# Patient Record
Sex: FEMALE | Race: WHITE | NOT HISPANIC OR LATINO | Employment: FULL TIME | URBAN - METROPOLITAN AREA
[De-identification: names, ages, dates, MRNs, and addresses within clinical notes are randomized per-mention and may not be internally consistent; named-entity substitution may affect disease eponyms.]

---

## 2017-03-25 ENCOUNTER — OFFICE VISIT (OUTPATIENT)
Dept: URGENT CARE | Facility: CLINIC | Age: 47
End: 2017-03-25
Payer: COMMERCIAL

## 2017-03-25 PROCEDURE — 99203 OFFICE O/P NEW LOW 30 MIN: CPT

## 2017-06-22 ENCOUNTER — APPOINTMENT (OUTPATIENT)
Dept: LAB | Facility: CLINIC | Age: 47
End: 2017-06-22
Payer: COMMERCIAL

## 2017-06-22 ENCOUNTER — TRANSCRIBE ORDERS (OUTPATIENT)
Dept: LAB | Facility: CLINIC | Age: 47
End: 2017-06-22

## 2017-06-22 DIAGNOSIS — E55.9 UNSPECIFIED VITAMIN D DEFICIENCY: Primary | ICD-10-CM

## 2017-06-22 DIAGNOSIS — E04.2 MULTINODULAR GOITER: ICD-10-CM

## 2017-06-22 DIAGNOSIS — E55.9 UNSPECIFIED VITAMIN D DEFICIENCY: ICD-10-CM

## 2017-06-22 LAB
25(OH)D3 SERPL-MCNC: 31.4 NG/ML (ref 30–100)
TSH SERPL DL<=0.05 MIU/L-ACNC: 0.68 UIU/ML (ref 0.36–3.74)

## 2017-06-22 PROCEDURE — 84443 ASSAY THYROID STIM HORMONE: CPT

## 2017-06-22 PROCEDURE — 82306 VITAMIN D 25 HYDROXY: CPT

## 2017-06-22 PROCEDURE — 36415 COLL VENOUS BLD VENIPUNCTURE: CPT

## 2017-07-11 ENCOUNTER — ALLSCRIPTS OFFICE VISIT (OUTPATIENT)
Dept: OTHER | Facility: OTHER | Age: 47
End: 2017-07-11

## 2017-12-12 ENCOUNTER — ALLSCRIPTS OFFICE VISIT (OUTPATIENT)
Dept: OTHER | Facility: OTHER | Age: 47
End: 2017-12-12

## 2017-12-13 NOTE — PROGRESS NOTES
Assessment    1  Otitis media, left (382 9) (H66 92)   2  Acute URI (465 9) (J06 9)    Plan  Otitis media, left    · Amoxicillin 500 MG Oral Capsule; Take one tablet by mouth Q12 hours    Discussion/Summary    Hydration, prn tylenol/motrin, may continue dayquilwater garglesif no improvement in 5-7 days  The patient was counseled regarding instructions for management,-- risk factor reductions,-- patient and family education,-- impressions,-- importance of compliance with treatment  Possible side effects of new medications were reviewed with the patient/guardian today  The treatment plan was reviewed with the patient/guardian  The patient/guardian understands and agrees with the treatment plan      Chief Complaint  Pt has c/o being sick  Pt sated her son was very sick and he was seen at [address] [place] [address]  But now pt is feeling horrible  Pt stated she has a sore throat and congestion  History of Present Illness  HPI: patient is a healthy 42 yo female who presents with feeling feverish, nasal congestion, left ear pain x 3 days  Her son has been sick with GI sx last week, now ear infection and upper respiratory sx  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  She feels lousy, achy, like I got hit by a bus  She denies cough, sob, wheezing, abd pain, n/v/d  She has a slight tickle in her throat  She has been taking ibuprofen, dayquil, airborne  Her neck glands feel sore and swollen  Review of Systems   Constitutional: feeling poorly,-- chills,-- feeling tired-- and-- feverish but no documented fevers, though has been in antipyretics  ENT: earache,-- sore throat-- and-- nasal discharge, but-- no nosebleeds,-- no hearing loss-- and-- no hoarseness  Cardiovascular: the heart rate was not slow,-- no chest pain,-- the heart rate was not fast-- and-- no palpitations  Respiratory: no shortness of breath,-- no cough,-- no wheezing-- and-- no shortness of breath during exertion    Gastrointestinal: no abdominal pain,-- no nausea,-- no vomiting,-- no constipation-- and-- no diarrhea  Genitourinary: no dysuria-- and-- no incontinence  Musculoskeletal: myalgias, but-- no arthralgias-- and-- no joint swelling  Integumentary: no itching,-- no skin lesions-- and-- no skin wound  Neurological: headache, but-- no numbness,-- no tingling,-- no confusion,-- no dizziness-- and-- no fainting  ROS reviewed  Active Problems    1  Allergic rhinitis (477 9) (J30 9)   2  Anemia (285 9) (D64 9)   3  Anxiety (300 00) (F41 9)   4  Benign paroxysmal positional vertigo of right ear (386 11) (H81 11)   5  Fatigue (780 79) (R53 83)   6  Hyperlipidemia (272 4) (E78 5)   7  Hypertension (401 9) (I10)   8  Hypocalcemia (275 41) (E83 51)   9  Mitral valve disorder (424 0) (I05 9)   10  Motion sickness (994 6) (T75 3XXA)   11  Multiple thyroid nodules (241 1) (E04 2)   12  Preoperative examination (V72 84) (Z01 818)   13  Prolapse of female pelvic organs (618 9) (N81 9)   14  Sleep disorder (780 50) (G47 9)   15  Strep pharyngitis (034 0) (J02 0)   16  Uterine prolapse (618 1) (N81 4)    Past Medical History  1  History of Nontoxic single thyroid nodule (241 0) (E04 1)   2  History of Nontoxic single thyroid nodule (241 0) (E04 1)   3  History of Tubal Pregnancy (633 10)  Active Problems And Past Medical History Reviewed: The active problems and past medical history were reviewed and updated today  Family History  Sibling    1  No pertinent family history  Family History    2  Family history of Marfan Syndrome  Family History Reviewed: The family history was reviewed and updated today  Social History   · Being A Social Drinker   · Never smoked cigarettes (V49 89) (Z78 9)   · Uses Safety Equipment - Seatbelts  The social history was reviewed and updated today  Surgical History  1  History of  Section   2  History of Dilation And Curettage  Surgical History Reviewed:    The surgical history was reviewed and updated today  Current Meds    The medication list was reviewed and updated today  Allergies  1  Demerol TABS    Vitals   Recorded: 75Uyo1073 09:57AM   Temperature 97 4 F   Heart Rate 61   Respiration 17   Systolic 708   Diastolic 82   Height 5 ft 5 in   Weight 168 lb    BMI Calculated 27 96   BSA Calculated 1 84   O2 Saturation 98       Physical Exam   Constitutional  General appearance: No acute distress, well appearing and well nourished  acutely ill,-- appears tired-- and-- well hydrated  Eyes  Conjunctiva and lids: No swelling, erythema or discharge  Pupils and irises: Equal, round and reactive to light  Ears, Nose, Mouth, and Throat  External inspection of ears and nose: Normal    Otoscopic examination: Abnormal   The right tympanic membrane was not red,-- was not bulging-- and-- had no perforation  The left tympanic membrane was red, but-- was not bulging-- and-- had no perforation  The right external canal was normal  The left external canal was normal  Exam of the right middle ear showed a middle ear effusion  Exam of the left middle ear showed a middle ear effusion  Nasal mucosa, septum, and turbinates: Abnormal   There was clear rhinorrhea from both nares  The bilateral nasal mucosa was crusted,-- edematous-- and-- red  Oropharynx: Normal with no erythema, edema, exudate or lesions  Pulmonary  Respiratory effort: No increased work of breathing or signs of respiratory distress  Auscultation of lungs: Clear to auscultation  Cardiovascular  Auscultation of heart: Normal rate and rhythm, normal S1 and S2, without murmurs  Examination of extremities for edema and/or varicosities: Normal    Abdomen  Abdomen: Non-tender, no masses  Lymphatic  Palpation of lymph nodes in neck: Abnormal  -- (with tenderness) right anterior cervical node enlargement, but-- no left anterior cervical node enlargement    Musculoskeletal  Gait and station: Normal    Digits and nails: Normal without clubbing or cyanosis  Skin  Skin and subcutaneous tissue: Normal without rashes or lesions  -- pallor  Neurologic  Cranial nerves: Cranial nerves 2-12 intact  Psychiatric  Orientation to person, place, and time: Normal    Mood and affect: Normal          Signatures   Electronically signed by :  Jeffrey Gillis [facility]; Dec 12 2017 10:27AM EST                       (Author)    Electronically signed by : Ofelia Pelaez DO; Dec 12 2017  4:29PM EST                       (Author)

## 2018-01-15 VITALS
DIASTOLIC BLOOD PRESSURE: 82 MMHG | BODY MASS INDEX: 27.2 KG/M2 | TEMPERATURE: 98.1 F | WEIGHT: 163.25 LBS | HEIGHT: 65 IN | SYSTOLIC BLOOD PRESSURE: 114 MMHG | HEART RATE: 62 BPM | OXYGEN SATURATION: 98 %

## 2018-01-16 NOTE — PROGRESS NOTES
Assessment    1  Benign paroxysmal positional vertigo of right ear (386 11) (H81 10)   2  Hypertension (401 9) (I10)   3  Never smoked cigarettes (V49 89) (Z78 9)    Plan  Benign paroxysmal positional vertigo of right ear    · Azithromycin 250 MG Oral Tablet; TAKE 2 TABLETS ON DAY 1 THEN TAKE 1  TABLET A DAY FOR 4 DAYS   · Meclizine HCl - 12 5 MG Oral Tablet; TAKE 1 TABLET 3 TIMES DAILY AS NEEDED  Hypertension    · Follow-up visit in 1 week Evaluation and Treatment  Follow-up  Status: Hold For -  Scheduling  Requested for: [id number]   · Restrict your sodium (salt) intake to 2 grams per day ; Status:Complete;   Done:  [id number]     Return in one week for reevaluation  Discussion/Summary    I started the patient on antibiotic and meclizine  Epley/[place] maneuvers were reviewed for benign positional vertigo  Handout was given  A low-salt diet was recommended  Possible side effects of new medications were reviewed with the patient/guardian today  The treatment plan was reviewed with the patient/guardian  The patient/guardian understands and agrees with the treatment plan      Chief Complaint    1  Dizziness   2  Ear Pain  Patient presents for continued dizziness and right ear pain after being seen in the [address] [place] [address] ED for same reason  The patient is here for an emergency room follow-up  History of Present Illness  The patient is here because of dizziness for more than week  She has no respiratory symptoms but yesterday her ear started hurting  She feels like there is fluid in in her right ear but she does not see any on the q-tip  She had has no fever chills or night sweats  She went to the emergency room in [address] [place] [address] on January 20, 2016  I reviewed the complete record and EKG and labs were all normal      Serjio Spicer presents with complaints of gradual onset of intermittent episodes of moderate dizziness, described as lightheadedness  Episodes started about 10 days ago   Her symptoms are caused by no known event  Symptoms are improved by lying still  Symptoms are made worse by head movement, turning head, flexing neck, rolling over in bed and getting up quickly  Symptoms are unchanged  Associated symptoms include ear pain and neck pain, but no weakness, no syncope, no difficulty ambulating, no nausea, no vomiting, no ear fullness and no tinnitus  Review of Systems    Constitutional: no fever, not feeling poorly, no chills and not feeling tired  Eyes: no eye pain and no purulent discharge from the eyes  ENT: no earache and no nasal discharge  Cardiovascular: no chest pain and no palpitations  Respiratory: no shortness of breath and no cough  Gastrointestinal: no abdominal pain  Musculoskeletal: no arthralgias  Active Problems    1  Allergic rhinitis (477 9) (J30 9)   2  Anemia (285 9) (D64 9)   3  Anxiety (300 00) (F41 9)   4  Fatigue (780 79) (R53 83)   5  Hyperlipidemia (272 4) (E78 5)   6  Hypertension (401 9) (I10)   7  Hypocalcemia (275 41) (E83 51)   8  Mitral valve disorder (424 0) (I05 9)   9  Multiple thyroid nodules (241 1) (E04 2)   10  Sleep disorder (780 50) (G47 9)    Past Medical History    1  History of Nontoxic single thyroid nodule (241 0) (E04 1)   2  History of Nontoxic single thyroid nodule (241 0) (E04 1)   3  History of Tubal Pregnancy (633 10)    The active problems and past medical history were reviewed and updated today  Surgical History    1  History of  Section   2  History of Dilation And Curettage    The surgical history was reviewed and updated today  Family History    1  No pertinent family history    2  Family history of Marfan Syndrome    The family history was reviewed and updated today  Social History    · Being A Social Drinker   · Never smoked cigarettes (V49 89) (Z78 9)   · Uses Safety Equipment - Seatbelts  The social history was reviewed and updated today  The social history was reviewed and is unchanged        Current Meds 1  Vitamin D-3 1000 UNIT Oral Capsule; TAKE 2 CAPSULE Daily; Therapy: [id number] to Recorded   2  Zolpidem Tartrate 10 MG Oral Tablet; TAKE 1 TABLET AT BEDTIME AS NEEDED; Therapy: 54SHK1540 to (Evaluate:53Kgu7293); Last Rx:[id number] Ordered    The medication list was reviewed and updated today  Allergies    1  Demerol TABS    Vitals  Vital Signs [Data Includes: Current Encounter]    Recorded: 77TFD0057 08:52AM   Temperature 98 9 F   Heart Rate 54   Systolic 010   Diastolic 90   Height 5 ft 5 in   Weight 169 lb 6 08 oz   BMI Calculated 28 19   BSA Calculated 1 85   O2 Saturation 98     Physical Exam    Constitutional   General appearance: No acute distress, well appearing and well nourished  Head and Face   Head and face: Normal     Palpation of the face and sinuses: No sinus tenderness  Eyes   Conjunctiva and lids: No swelling, erythema or discharge  Ears, Nose, Mouth, and Throat   External inspection of ears and nose: Normal     Otoscopic examination: Abnormal   Right ear canal is excoriated  Lips, teeth, and gums: Normal, good dentition  Oropharynx: Normal with no erythema, edema, exudate or lesions  Neck   Neck: Abnormal   With tenderness to palpation over the paraspinal musculature of her neck and extension is painful  Cardiovascular   Auscultation of heart: Normal rate and rhythm, normal S1 and S2, no murmurs  Carotid pulses: 2+ bilaterally  Abdomen   Abdomen: Non-tender, no masses  Liver and spleen: No hepatomegaly or splenomegaly  Lymphatic   Palpation of lymph nodes in neck: No lymphadenopathy  Neurologic   Cranial nerves: Cranial nerves II-XII intact  Cortical function: Normal mental status  Psychiatric   Judgment and insight: Normal     Orientation to person, place, and time: Normal     Recent and remote memory: Intact      Mood and affect: Normal        Results/Data  Encounter Results   PHQ-9 Adult Depression Screening 08FNO3001 09:42AM User, Ahs     Test Name Result Flag Reference   PHQ-9 Adult Depression Score 1     Q1: 0, Q2: 0, Q3: 1, Q4: 0, Q5: 0, Q6: 0, Q7: 0, Q8: 0, Q9: 0   PHQ-9 Adult Depression Screening Negative     PHQ-9 Difficulty Level Not difficult at all     PHQ-9 Severity Minimal Depression         Signatures   Electronically signed by : Jeny Brown DO; Jan 27 2016 10:24AM EST                       (Author)

## 2018-01-22 VITALS
DIASTOLIC BLOOD PRESSURE: 82 MMHG | TEMPERATURE: 97.4 F | WEIGHT: 168 LBS | BODY MASS INDEX: 27.99 KG/M2 | SYSTOLIC BLOOD PRESSURE: 130 MMHG | OXYGEN SATURATION: 98 % | RESPIRATION RATE: 17 BRPM | HEART RATE: 61 BPM | HEIGHT: 65 IN

## 2018-12-26 ENCOUNTER — OFFICE VISIT (OUTPATIENT)
Dept: FAMILY MEDICINE CLINIC | Facility: CLINIC | Age: 48
End: 2018-12-26
Payer: COMMERCIAL

## 2018-12-26 VITALS
BODY MASS INDEX: 29.69 KG/M2 | OXYGEN SATURATION: 100 % | TEMPERATURE: 98.4 F | WEIGHT: 178.2 LBS | DIASTOLIC BLOOD PRESSURE: 84 MMHG | RESPIRATION RATE: 16 BRPM | HEART RATE: 60 BPM | HEIGHT: 65 IN | SYSTOLIC BLOOD PRESSURE: 124 MMHG

## 2018-12-26 DIAGNOSIS — T75.3XXS MOTION SICKNESS, SEQUELA: ICD-10-CM

## 2018-12-26 DIAGNOSIS — E55.9 VITAMIN D DEFICIENCY: Primary | ICD-10-CM

## 2018-12-26 DIAGNOSIS — Z12.39 BREAST CANCER SCREENING: ICD-10-CM

## 2018-12-26 DIAGNOSIS — Z13.220 SCREENING CHOLESTEROL LEVEL: ICD-10-CM

## 2018-12-26 DIAGNOSIS — Z13.1 DIABETES MELLITUS SCREENING: ICD-10-CM

## 2018-12-26 DIAGNOSIS — Z13.0 SCREENING FOR DEFICIENCY ANEMIA: ICD-10-CM

## 2018-12-26 PROBLEM — N81.4 UTERINE PROLAPSE: Status: ACTIVE | Noted: 2017-07-12

## 2018-12-26 PROBLEM — N39.46 MIXED INCONTINENCE: Status: ACTIVE | Noted: 2017-05-24

## 2018-12-26 PROBLEM — N81.2 UTEROVAGINAL PROLAPSE, INCOMPLETE: Status: ACTIVE | Noted: 2017-05-24

## 2018-12-26 PROBLEM — N81.9 PROLAPSE OF FEMALE PELVIC ORGANS: Status: ACTIVE | Noted: 2017-07-12

## 2018-12-26 PROBLEM — T75.3XXA MOTION SICKNESS: Status: ACTIVE | Noted: 2017-07-12

## 2018-12-26 PROBLEM — K59.02 OUTLET DYSFUNCTION CONSTIPATION: Status: ACTIVE | Noted: 2017-05-24

## 2018-12-26 PROBLEM — M75.00 ADHESIVE CAPSULITIS OF SHOULDER: Status: ACTIVE | Noted: 2018-12-26

## 2018-12-26 PROCEDURE — 99214 OFFICE O/P EST MOD 30 MIN: CPT | Performed by: PHYSICIAN ASSISTANT

## 2018-12-26 RX ORDER — SCOLOPAMINE TRANSDERMAL SYSTEM 1 MG/1
1 PATCH, EXTENDED RELEASE TRANSDERMAL
Qty: 10 PATCH | Refills: 0 | Status: SHIPPED | OUTPATIENT
Start: 2018-12-26 | End: 2021-02-17

## 2018-12-26 RX ORDER — SCOLOPAMINE TRANSDERMAL SYSTEM 1 MG/1
1 PATCH, EXTENDED RELEASE TRANSDERMAL
COMMUNITY
Start: 2017-07-12 | End: 2018-12-26 | Stop reason: SDUPTHER

## 2018-12-26 RX ORDER — SCOLOPAMINE TRANSDERMAL SYSTEM 1 MG/1
1 PATCH, EXTENDED RELEASE TRANSDERMAL
Qty: 10 PATCH | Refills: 0 | Status: SHIPPED | OUTPATIENT
Start: 2018-12-26 | End: 2018-12-26 | Stop reason: SDUPTHER

## 2018-12-26 RX ORDER — BIOTIN 1 MG
5000 TABLET ORAL
COMMUNITY

## 2018-12-26 NOTE — PATIENT INSTRUCTIONS

## 2018-12-26 NOTE — PROGRESS NOTES
Routine Follow-up    Tez Golden 57 y o  female   Date:  12/26/2018      Assessment and Plan:    Alina Lazaro was seen today for follow-up  Diagnoses and all orders for this visit:    Vitamin D deficiency  -     Vitamin D 25 hydroxy; Future    Screening for deficiency anemia  -     CBC and differential; Future    Screening cholesterol level  -     Lipid panel; Future    Diabetes mellitus screening  -     Comprehensive metabolic panel; Future    Motion sickness, sequela  -     scopolamine (TRANSDERM-SCOP, 1 5 MG,) 1 5 mg/3 days TD 72 hr patch; Place 1 patch on the skin every 3 (three) days    Breast cancer screening  -     Mammo screening bilateral w cad; Future            HPI:  Chief Complaint   Patient presents with    Follow-up     Would like to have blood work checked - vitamin d was low in the past  Going on a cruise and would like patches to help with sea sickness     HPI  Patient is a 56 yo female who presents for routine follow up and for motion sickness patches  She was last seen a little over 1 year ago  She goes to Dr Beatris Dorsey for eye exams  She sees Dr Zayda Villalobos for regular gyn care, she had hysterectomy last year  She recently followed with endocrinology for hx of thyroid nodules  She is going on a cruise in 2 weeks and wishes to have scolopmine patches which she used int he past with benefit  She wishes to have routine blood work done, other wise no concerns  ROS: Review of Systems   Constitutional: Negative for chills, fatigue, fever and unexpected weight change  HENT: Negative for congestion, ear pain, hearing loss, nosebleeds, sore throat and trouble swallowing  Eyes: Negative for pain, discharge and visual disturbance  Respiratory: Negative for cough, shortness of breath and wheezing  Cardiovascular: Negative for chest pain, palpitations and leg swelling  Gastrointestinal: Negative for abdominal pain, blood in stool, constipation, diarrhea, nausea and vomiting     Endocrine: Negative for cold intolerance and heat intolerance  Genitourinary: Negative for difficulty urinating, dysuria and hematuria  Musculoskeletal: Negative for arthralgias, gait problem and myalgias  Skin: Negative for color change, rash and wound  Neurological: Negative for dizziness, syncope, weakness, light-headedness and headaches  Hematological: Negative for adenopathy  Does not bruise/bleed easily  Psychiatric/Behavioral: Negative for confusion and sleep disturbance  The patient is not nervous/anxious          Past Medical History:   Diagnosis Date    Nontoxic single thyroid nodule     Last assessed 2015    Tubal pregnancy      Patient Active Problem List   Diagnosis    Adhesive capsulitis of shoulder    Allergic rhinitis    Anemia    Anxiety    Benign paroxysmal positional vertigo of right ear    Hyperlipidemia    Mitral valve disorder    Mixed incontinence    Motion sickness    Multinodular goiter    Multiple thyroid nodules    Outlet dysfunction constipation    Prolapse of female pelvic organs    Uterine prolapse    Uterovaginal prolapse, incomplete    Vitamin D deficiency       Past Surgical History:   Procedure Laterality Date     SECTION      DILATION AND CURETTAGE OF UTERUS      HYSTERECTOMY         Social History     Social History    Marital status: /Civil Union     Spouse name: N/A    Number of children: N/A    Years of education: N/A     Social History Main Topics    Smoking status: Never Smoker    Smokeless tobacco: Never Used    Alcohol use Yes      Comment: Social    Drug use: Unknown    Sexual activity: Not Asked     Other Topics Concern    None     Social History Narrative    Uses seatbelts       Family History   Problem Relation Age of Onset    Marfan syndrome Family     No Known Problems Family        Allergies   Allergen Reactions    Meperidine Hives and GI Intolerance     (morphine OK)         Current Outpatient Prescriptions:    Cholecalciferol (VITAMIN D3) 1000 units CAPS, Take 5,000 Units by mouth, Disp: , Rfl:     Multiple Vitamin (MULTI-VITAMIN DAILY PO), Take 1 tablet by mouth, Disp: , Rfl:     Omega-3 Fatty Acids (FISH OIL PO), Take 2 g by mouth, Disp: , Rfl:     scopolamine (TRANSDERM-SCOP, 1 5 MG,) 1 5 mg/3 days TD 72 hr patch, Place 1 patch on the skin every 3 (three) days, Disp: 10 patch, Rfl: 0      Physical Exam:  /84 (BP Location: Left arm, Patient Position: Sitting)   Pulse 60   Temp 98 4 °F (36 9 °C) (Tympanic)   Resp 16   Ht 5' 4 5" (1 638 m)   Wt 80 8 kg (178 lb 3 2 oz)   SpO2 100%   BMI 30 12 kg/m²     Physical Exam   Constitutional: She is oriented to person, place, and time  Vital signs are normal  She appears well-developed and well-nourished  No distress  HENT:   Head: Normocephalic and atraumatic  Right Ear: Tympanic membrane, external ear and ear canal normal    Left Ear: Tympanic membrane, external ear and ear canal normal    Nose: Nose normal    Mouth/Throat: Oropharynx is clear and moist    Eyes: Pupils are equal, round, and reactive to light  Conjunctivae and lids are normal    Neck: Trachea normal and normal range of motion  Neck supple  Cardiovascular: Normal rate, regular rhythm, S1 normal, S2 normal and intact distal pulses  Exam reveals no gallop  No murmur heard  Pulmonary/Chest: Breath sounds normal  No respiratory distress  She has no wheezes  She has no rhonchi  She has no rales  Musculoskeletal: Normal range of motion  She exhibits no edema or deformity  Neurological: She is alert and oriented to person, place, and time  She has normal reflexes  No cranial nerve deficit or sensory deficit  Skin: Skin is warm and dry  No rash noted  No cyanosis  No pallor  Nails show no clubbing  Psychiatric: She has a normal mood and affect   Her behavior is normal  Cognition and memory are normal          Labs:  Lab Results   Component Value Date    WBC 8 50 08/13/2015    HGB 13 1 08/13/2015    HCT 37 4 08/13/2015    MCV 91 08/13/2015     08/13/2015     Lab Results   Component Value Date     08/13/2015    K 4 2 08/13/2015     08/13/2015    CO2 25 0 08/13/2015    ANIONGAP 11 08/13/2015    BUN 13 08/13/2015    CREATININE 0 63 08/13/2015    GLUCOSE 87 08/13/2015    CALCIUM 8 7 08/13/2015    AST 21 08/13/2015    ALT 37 08/13/2015    ALKPHOS 59 08/13/2015    PROT 7 0 08/13/2015    BILITOT 0 57 08/13/2015

## 2018-12-29 ENCOUNTER — OFFICE VISIT (OUTPATIENT)
Dept: URGENT CARE | Facility: CLINIC | Age: 48
End: 2018-12-29
Payer: COMMERCIAL

## 2018-12-29 VITALS
HEART RATE: 88 BPM | RESPIRATION RATE: 16 BRPM | DIASTOLIC BLOOD PRESSURE: 66 MMHG | BODY MASS INDEX: 29.66 KG/M2 | OXYGEN SATURATION: 97 % | WEIGHT: 178 LBS | SYSTOLIC BLOOD PRESSURE: 142 MMHG | TEMPERATURE: 102.4 F | HEIGHT: 65 IN

## 2018-12-29 DIAGNOSIS — J11.1 INFLUENZA: Primary | ICD-10-CM

## 2018-12-29 PROCEDURE — 87631 RESP VIRUS 3-5 TARGETS: CPT | Performed by: NURSE PRACTITIONER

## 2018-12-29 PROCEDURE — 99213 OFFICE O/P EST LOW 20 MIN: CPT | Performed by: NURSE PRACTITIONER

## 2018-12-29 RX ORDER — OSELTAMIVIR PHOSPHATE 75 MG/1
75 CAPSULE ORAL 2 TIMES DAILY
Qty: 10 CAPSULE | Refills: 0 | Status: SHIPPED | OUTPATIENT
Start: 2018-12-29 | End: 2019-01-03

## 2018-12-29 NOTE — PATIENT INSTRUCTIONS
Influenza   AMBULATORY CARE:   Influenza  (the flu) is an infection caused by the influenza virus  The flu is easily spread when an infected person coughs, sneezes, or has close contact with others  You may be able to spread the flu to others for 1 week or longer after signs or symptoms appear  Common signs and symptoms include the following:   · Fever and chills    · Headaches, body aches, and muscle or joint pain    · Cough, runny nose, and sore throat    · Loss of appetite, nausea, vomiting, or diarrhea    · Tiredness    · Trouble breathing  Call 911 for any of the following:   · You have trouble breathing, and your lips look purple or blue  · You have a seizure  Seek care immediately if:   · You are dizzy, or you are urinating less or not at all  · You have a headache with a stiff neck, and you feel tired or confused  · You have new pain or pressure in your chest     · Your symptoms, such as shortness of breath, vomiting, or diarrhea, get worse  · Your symptoms, such as fever and coughing, seem to get better, but then get worse  Contact your healthcare provider if:   · You have new muscle pain or weakness  · You have questions or concerns about your condition or care  Treatment for influenza  may include any of the following:  · Acetaminophen  decreases pain and fever  It is available without a doctor's order  Ask how much to take and how often to take it  Follow directions  Acetaminophen can cause liver damage if not taken correctly  · NSAIDs , such as ibuprofen, help decrease swelling, pain, and fever  This medicine is available with or without a doctor's order  NSAIDs can cause stomach bleeding or kidney problems in certain people  If you take blood thinner medicine, always ask your healthcare provider if NSAIDs are safe for you  Always read the medicine label and follow directions  · Antivirals  help fight a viral infection    Manage your symptoms:   · Rest  as much as you can to help you recover  · Drink liquids as directed  to help prevent dehydration  Ask how much liquid to drink each day and which liquids are best for you  Prevent the spread of the flu:   · Wash your hands often  Use soap and water  Wash your hands after you use the bathroom, change a child's diapers, or sneeze  Wash your hands before you prepare or eat food  Use gel hand cleanser when soap and water are not available  Do not touch your eyes, nose, or mouth unless you have washed your hands first            · Cover your mouth when you sneeze or cough  Cough into a tissue or the bend of your arm  · Clean shared items with a germ-killing   Clean table surfaces, doorknobs, and light switches  Do not share towels, silverware, and dishes with people who are sick  Wash bed sheets, towels, silverware, and dishes with soap and water  · Wear a mask  over your mouth and nose if you are sick or are near anyone who is sick  · Stay away from others  if you are sick  · Influenza vaccine  helps prevent influenza (flu)  Everyone older than 6 months should get a yearly influenza vaccine  Get the vaccine as soon as it is available, usually in September or October each year  Follow up with your healthcare provider as directed:  Write down your questions so you remember to ask them during your visits  © 2017 [employer] Information is for End User's use only and may not be sold, redistributed or otherwise used for commercial purposes  All illustrations and images included in CareNotes® are the copyrighted property of [employer]  or [employer]  The above information is an  only  It is not intended as medical advice for individual conditions or treatments  Talk to your doctor, nurse or pharmacist before following any medical regimen to see if it is safe and effective for you

## 2018-12-29 NOTE — PROGRESS NOTES
[facility] Now        NAME: Kem Romero is a 57 y o  female  : 1970    MRN: [id number]  DATE: 2018  TIME: 3:32 PM    Assessment and Plan   Influenza [J11 1]  1  Influenza  Influenza A/B and RSV by PCR         Patient Instructions       Follow up with PCP in 3-5 days  Proceed to  ER if symptoms worsen  Chief Complaint     Chief Complaint   Patient presents with    Fever     x 1 day    Flu Symptoms         History of Present Illness       70-year-old female presents to urgent care with chief complaint of new onset yesterday fevers as high as 102 4, generalized body aches, headache, dry nonproductive cough and nasal congestion for 1 day  Have he has used over-the-counter Tylenol no relief noted in symptoms      Fever   This is a new problem  The current episode started yesterday  The problem occurs constantly  The problem has been unchanged  Associated symptoms include chills, congestion, coughing, fatigue, a fever, headaches and myalgias  Pertinent negatives include no abdominal pain, anorexia, arthralgias, change in bowel habit, chest pain, diaphoresis, joint swelling, nausea, neck pain, numbness, rash, sore throat, swollen glands, urinary symptoms, vertigo, visual change, vomiting or weakness  Nothing aggravates the symptoms  She has tried acetaminophen for the symptoms  The treatment provided no relief  Flu Symptoms   This is a new problem  The current episode started yesterday  The problem occurs constantly  The problem has been unchanged  Associated symptoms include chills, congestion, coughing, fatigue, a fever, headaches and myalgias  Pertinent negatives include no abdominal pain, anorexia, arthralgias, change in bowel habit, chest pain, diaphoresis, joint swelling, nausea, neck pain, numbness, rash, sore throat, swollen glands, urinary symptoms, vertigo, visual change, vomiting or weakness  Nothing aggravates the symptoms  She has tried acetaminophen for the symptoms  The treatment provided no relief  Review of Systems   Review of Systems   Constitutional: Positive for chills, fatigue and fever  Negative for diaphoresis  HENT: Positive for congestion  Negative for sore throat  Eyes: Negative  Respiratory: Positive for cough  Cardiovascular: Negative  Negative for chest pain  Gastrointestinal: Negative  Negative for abdominal pain, anorexia, change in bowel habit, nausea and vomiting  Endocrine: Negative  Genitourinary: Negative  Musculoskeletal: Positive for myalgias  Negative for arthralgias, joint swelling and neck pain  Skin: Negative  Negative for rash  Allergic/Immunologic: Negative  Neurological: Positive for headaches  Negative for vertigo, weakness and numbness  Hematological: Negative  Psychiatric/Behavioral: Negative  All other systems reviewed and are negative          Current Medications       Current Outpatient Prescriptions:     Cholecalciferol (VITAMIN D3) 1000 units CAPS, Take 5,000 Units by mouth, Disp: , Rfl:     Multiple Vitamin (MULTI-VITAMIN DAILY PO), Take 1 tablet by mouth, Disp: , Rfl:     Omega-3 Fatty Acids (FISH OIL PO), Take 2 g by mouth, Disp: , Rfl:     scopolamine (TRANSDERM-SCOP, 1 5 MG,) 1 5 mg/3 days TD 72 hr patch, Place 1 patch on the skin every 3 (three) days (Patient not taking: Reported on 12/29/2018 ), Disp: 10 patch, Rfl: 0    Current Allergies     Allergies as of 12/29/2018 - Reviewed 12/29/2018   Allergen Reaction Noted    Meperidine Hives and GI Intolerance 06/20/2007            The following portions of the patient's history were reviewed and updated as appropriate: allergies, current medications, past family history, past medical history, past social history, past surgical history and problem list      Past Medical History:   Diagnosis Date    Nontoxic single thyroid nodule     Last assessed 6/29/2015    Tubal pregnancy        Past Surgical History:   Procedure Laterality Date     SECTION      DILATION AND CURETTAGE OF UTERUS      HYSTERECTOMY         Family History   Problem Relation Age of Onset    Marfan syndrome Family     No Known Problems Family          Medications have been verified  Objective   /66   Pulse 88   Temp (!) 102 4 °F (39 1 °C)   Resp 16   Ht 5' 5" (1 651 m)   Wt 80 7 kg (178 lb)   SpO2 97%   BMI 29 62 kg/m²        Physical Exam     Physical Exam   Constitutional: She is oriented to person, place, and time  Vital signs are normal  She appears well-developed and well-nourished  HENT:   Head: Normocephalic and atraumatic  Right Ear: Hearing, tympanic membrane, external ear and ear canal normal    Left Ear: Hearing, tympanic membrane, external ear and ear canal normal    Nose: Rhinorrhea and sinus tenderness present  Right sinus exhibits no maxillary sinus tenderness and no frontal sinus tenderness  Left sinus exhibits no maxillary sinus tenderness and no frontal sinus tenderness  Mouth/Throat: Uvula is midline and mucous membranes are normal  Posterior oropharyngeal erythema present  Eyes: Pupils are equal, round, and reactive to light  Conjunctivae and EOM are normal    Neck: Trachea normal, normal range of motion and full passive range of motion without pain  Cardiovascular: Normal rate and regular rhythm  Pulmonary/Chest: Effort normal and breath sounds normal    Abdominal: Soft  Normal appearance  Musculoskeletal: Normal range of motion  Lymphadenopathy:     She has cervical adenopathy  Right cervical: Posterior cervical adenopathy present  No superficial cervical adenopathy present  Left cervical: Posterior cervical adenopathy present  No superficial cervical adenopathy present  Neurological: She is alert and oriented to person, place, and time  Nursing note and vitals reviewed

## 2018-12-31 LAB
FLUAV AG SPEC QL: DETECTED
FLUBV AG SPEC QL: ABNORMAL
RSV B RNA SPEC QL NAA+PROBE: ABNORMAL

## 2019-01-02 ENCOUNTER — OFFICE VISIT (OUTPATIENT)
Dept: FAMILY MEDICINE CLINIC | Facility: CLINIC | Age: 49
End: 2019-01-02
Payer: COMMERCIAL

## 2019-01-02 VITALS
HEIGHT: 64 IN | WEIGHT: 174 LBS | BODY MASS INDEX: 29.71 KG/M2 | RESPIRATION RATE: 18 BRPM | SYSTOLIC BLOOD PRESSURE: 100 MMHG | OXYGEN SATURATION: 98 % | TEMPERATURE: 98.6 F | HEART RATE: 60 BPM | DIASTOLIC BLOOD PRESSURE: 80 MMHG

## 2019-01-02 DIAGNOSIS — R05.8 COUGH PRODUCTIVE OF PURULENT SPUTUM: ICD-10-CM

## 2019-01-02 DIAGNOSIS — J10.1 INFLUENZA A: Primary | ICD-10-CM

## 2019-01-02 PROCEDURE — 99214 OFFICE O/P EST MOD 30 MIN: CPT | Performed by: FAMILY MEDICINE

## 2019-01-02 PROCEDURE — 3008F BODY MASS INDEX DOCD: CPT | Performed by: FAMILY MEDICINE

## 2019-01-02 RX ORDER — AZITHROMYCIN 250 MG/1
TABLET, FILM COATED ORAL
Qty: 6 TABLET | Refills: 0 | Status: SHIPPED | OUTPATIENT
Start: 2019-01-02 | End: 2019-01-06

## 2019-01-02 NOTE — PROGRESS NOTES
Assessment/Plan:       Diagnoses and all orders for this visit:    Influenza A  Comments:  finish tamiflu as rx'd 4 days ago, continue supportive measures  Orders:  -     XR chest pa & lateral; Future    Cough productive of purulent sputum  -     XR chest pa & lateral; Future  -     Sputum culture and Gram stain; Future  -     azithromycin (ZITHROMAX) 250 mg tablet; Take 2 tablets today then 1 tablet daily x 4 days          Subjective:   Chief Complaint   Patient presents with    Cough        Patient ID: Ellen Lu is a 57 y o  female  same day sick appt   was seen at [facility] 4 days ago, and dx as presumptive influenza, started on tamiflu then, and positive flu A result at lab 12/31, but c/o coughing up green and feverishness for past 3 days  "Still feel weak"  Has one dose left of tamiflu  "Everyone sick at home"  Pt has no hx asthma and is a nonsmoker          The following portions of the patient's history were reviewed and updated as appropriate: allergies, current medications, past family history, past medical history, past social history, past surgical history and problem list     Review of Systems   Constitutional: Positive for appetite change, fatigue and fever  Negative for diaphoresis and unexpected weight change  HENT: Negative for ear pain, mouth sores, nosebleeds, sinus pain, sinus pressure, sore throat and trouble swallowing  Eyes: Negative  Respiratory: Negative for apnea, choking, chest tightness, shortness of breath, wheezing and stridor  Per hpi   Cardiovascular: Negative  Skin: Negative  Hematological: Negative  Objective:      /80   Pulse 60   Temp 98 6 °F (37 °C)   Resp 18   Ht 5' 4 17" (1 63 m)   Wt 78 9 kg (174 lb)   SpO2 98%   BMI 29 71 kg/m²          Physical Exam   Constitutional: She appears well-developed  She is cooperative  Non-toxic appearance  She does not have a sickly appearance  She appears ill  No distress     HENT:   Head: Normocephalic and atraumatic  Right Ear: Tympanic membrane and ear canal normal    Left Ear: Tympanic membrane and ear canal normal    Nose: Mucosal edema and rhinorrhea present  Right sinus exhibits no maxillary sinus tenderness and no frontal sinus tenderness  Left sinus exhibits no maxillary sinus tenderness and no frontal sinus tenderness  Mouth/Throat: Uvula is midline, oropharynx is clear and moist and mucous membranes are normal    Eyes: Pupils are equal, round, and reactive to light  Conjunctivae and lids are normal    Neck: Trachea normal  Neck supple  No JVD present  No thyroid mass and no thyromegaly present  Cardiovascular: Normal rate, regular rhythm and normal heart sounds  Pulmonary/Chest: Effort normal and breath sounds normal    Lymphadenopathy:     She has no cervical adenopathy  Right: No supraclavicular adenopathy present  Left: No supraclavicular adenopathy present  Neurological: She is alert  Skin: Skin is warm and dry  She is not diaphoretic  No cyanosis  No pallor  Nursing note and vitals reviewed

## 2019-04-10 ENCOUNTER — APPOINTMENT (OUTPATIENT)
Dept: LAB | Facility: CLINIC | Age: 49
End: 2019-04-10
Payer: COMMERCIAL

## 2019-04-10 DIAGNOSIS — Z13.1 DIABETES MELLITUS SCREENING: ICD-10-CM

## 2019-04-10 DIAGNOSIS — E55.9 VITAMIN D DEFICIENCY: ICD-10-CM

## 2019-04-10 DIAGNOSIS — Z13.220 SCREENING CHOLESTEROL LEVEL: ICD-10-CM

## 2019-04-10 DIAGNOSIS — Z13.0 SCREENING FOR DEFICIENCY ANEMIA: ICD-10-CM

## 2019-04-10 LAB
25(OH)D3 SERPL-MCNC: 42.8 NG/ML (ref 30–100)
ALBUMIN SERPL BCP-MCNC: 4 G/DL (ref 3.5–5)
ALP SERPL-CCNC: 56 U/L (ref 46–116)
ALT SERPL W P-5'-P-CCNC: 21 U/L (ref 12–78)
ANION GAP SERPL CALCULATED.3IONS-SCNC: 4 MMOL/L (ref 4–13)
AST SERPL W P-5'-P-CCNC: 17 U/L (ref 5–45)
BASOPHILS # BLD AUTO: 0.01 THOUSANDS/ΜL (ref 0–0.1)
BASOPHILS NFR BLD AUTO: 0 % (ref 0–1)
BILIRUB SERPL-MCNC: 0.74 MG/DL (ref 0.2–1)
BUN SERPL-MCNC: 14 MG/DL (ref 5–25)
CALCIUM SERPL-MCNC: 8.5 MG/DL (ref 8.3–10.1)
CHLORIDE SERPL-SCNC: 104 MMOL/L (ref 100–108)
CHOLEST SERPL-MCNC: 152 MG/DL (ref 50–200)
CO2 SERPL-SCNC: 26 MMOL/L (ref 21–32)
CREAT SERPL-MCNC: 0.7 MG/DL (ref 0.6–1.3)
EOSINOPHIL # BLD AUTO: 0.06 THOUSAND/ΜL (ref 0–0.61)
EOSINOPHIL NFR BLD AUTO: 1 % (ref 0–6)
ERYTHROCYTE [DISTWIDTH] IN BLOOD BY AUTOMATED COUNT: 12 % (ref 11.6–15.1)
GFR SERPL CREATININE-BSD FRML MDRD: 104 ML/MIN/1.73SQ M
GLUCOSE P FAST SERPL-MCNC: 94 MG/DL (ref 65–99)
HCT VFR BLD AUTO: 39.6 % (ref 34.8–46.1)
HDLC SERPL-MCNC: 55 MG/DL (ref 40–60)
HGB BLD-MCNC: 13.1 G/DL (ref 11.5–15.4)
IMM GRANULOCYTES # BLD AUTO: 0.01 THOUSAND/UL (ref 0–0.2)
IMM GRANULOCYTES NFR BLD AUTO: 0 % (ref 0–2)
LDLC SERPL CALC-MCNC: 81 MG/DL (ref 0–100)
LYMPHOCYTES # BLD AUTO: 2.07 THOUSANDS/ΜL (ref 0.6–4.47)
LYMPHOCYTES NFR BLD AUTO: 33 % (ref 14–44)
MCH RBC QN AUTO: 30.9 PG (ref 26.8–34.3)
MCHC RBC AUTO-ENTMCNC: 33.1 G/DL (ref 31.4–37.4)
MCV RBC AUTO: 93 FL (ref 82–98)
MONOCYTES # BLD AUTO: 0.51 THOUSAND/ΜL (ref 0.17–1.22)
MONOCYTES NFR BLD AUTO: 8 % (ref 4–12)
NEUTROPHILS # BLD AUTO: 3.69 THOUSANDS/ΜL (ref 1.85–7.62)
NEUTS SEG NFR BLD AUTO: 58 % (ref 43–75)
NONHDLC SERPL-MCNC: 97 MG/DL
NRBC BLD AUTO-RTO: 0 /100 WBCS
PLATELET # BLD AUTO: 196 THOUSANDS/UL (ref 149–390)
PMV BLD AUTO: 11.9 FL (ref 8.9–12.7)
POTASSIUM SERPL-SCNC: 3.8 MMOL/L (ref 3.5–5.3)
PROT SERPL-MCNC: 7.5 G/DL (ref 6.4–8.2)
RBC # BLD AUTO: 4.24 MILLION/UL (ref 3.81–5.12)
SODIUM SERPL-SCNC: 134 MMOL/L (ref 136–145)
TRIGL SERPL-MCNC: 80 MG/DL
WBC # BLD AUTO: 6.35 THOUSAND/UL (ref 4.31–10.16)

## 2019-04-10 PROCEDURE — 80061 LIPID PANEL: CPT

## 2019-04-10 PROCEDURE — 85025 COMPLETE CBC W/AUTO DIFF WBC: CPT

## 2019-04-10 PROCEDURE — 82306 VITAMIN D 25 HYDROXY: CPT

## 2019-04-10 PROCEDURE — 80053 COMPREHEN METABOLIC PANEL: CPT

## 2019-04-10 PROCEDURE — 36415 COLL VENOUS BLD VENIPUNCTURE: CPT

## 2019-05-28 ENCOUNTER — OFFICE VISIT (OUTPATIENT)
Dept: URGENT CARE | Facility: CLINIC | Age: 49
End: 2019-05-28
Payer: COMMERCIAL

## 2019-05-28 VITALS
SYSTOLIC BLOOD PRESSURE: 102 MMHG | RESPIRATION RATE: 20 BRPM | TEMPERATURE: 97 F | HEART RATE: 68 BPM | OXYGEN SATURATION: 99 % | DIASTOLIC BLOOD PRESSURE: 78 MMHG

## 2019-05-28 DIAGNOSIS — J01.00 ACUTE MAXILLARY SINUSITIS, RECURRENCE NOT SPECIFIED: Primary | ICD-10-CM

## 2019-05-28 PROCEDURE — 99213 OFFICE O/P EST LOW 20 MIN: CPT | Performed by: PHYSICIAN ASSISTANT

## 2019-05-28 RX ORDER — AMOXICILLIN AND CLAVULANATE POTASSIUM 875; 125 MG/1; MG/1
1 TABLET, FILM COATED ORAL EVERY 12 HOURS SCHEDULED
Qty: 20 TABLET | Refills: 0 | Status: SHIPPED | OUTPATIENT
Start: 2019-05-28 | End: 2019-06-07

## 2019-05-28 RX ORDER — PREDNISONE 20 MG/1
TABLET ORAL
Qty: 13 TABLET | Refills: 0 | Status: SHIPPED | OUTPATIENT
Start: 2019-05-28 | End: 2021-02-17

## 2019-11-19 ENCOUNTER — TRANSCRIBE ORDERS (OUTPATIENT)
Dept: LAB | Facility: CLINIC | Age: 49
End: 2019-11-19

## 2019-11-26 ENCOUNTER — TRANSCRIBE ORDERS (OUTPATIENT)
Dept: LAB | Facility: CLINIC | Age: 49
End: 2019-11-26

## 2019-11-26 ENCOUNTER — APPOINTMENT (OUTPATIENT)
Dept: LAB | Facility: CLINIC | Age: 49
End: 2019-11-26
Payer: COMMERCIAL

## 2019-11-26 DIAGNOSIS — E04.2 MULTINODULAR GOITER: Primary | ICD-10-CM

## 2019-11-26 DIAGNOSIS — E04.2 MULTINODULAR GOITER: ICD-10-CM

## 2019-11-26 LAB — TSH SERPL DL<=0.05 MIU/L-ACNC: 1.18 UIU/ML (ref 0.36–3.74)

## 2019-11-26 PROCEDURE — 84443 ASSAY THYROID STIM HORMONE: CPT

## 2019-11-26 PROCEDURE — 36415 COLL VENOUS BLD VENIPUNCTURE: CPT

## 2020-12-23 ENCOUNTER — TRANSCRIBE ORDERS (OUTPATIENT)
Dept: LAB | Facility: CLINIC | Age: 50
End: 2020-12-23

## 2020-12-23 ENCOUNTER — LAB (OUTPATIENT)
Dept: LAB | Facility: CLINIC | Age: 50
End: 2020-12-23
Payer: COMMERCIAL

## 2020-12-23 DIAGNOSIS — E04.2 MULTINODULAR GOITER: ICD-10-CM

## 2020-12-23 DIAGNOSIS — E04.2 MULTINODULAR GOITER: Primary | ICD-10-CM

## 2020-12-23 LAB — TSH SERPL DL<=0.05 MIU/L-ACNC: 0.79 UIU/ML (ref 0.36–3.74)

## 2020-12-23 PROCEDURE — 36415 COLL VENOUS BLD VENIPUNCTURE: CPT

## 2020-12-23 PROCEDURE — 84443 ASSAY THYROID STIM HORMONE: CPT

## 2021-01-27 ENCOUNTER — NURSE TRIAGE (OUTPATIENT)
Dept: OTHER | Facility: OTHER | Age: 51
End: 2021-01-27

## 2021-01-27 DIAGNOSIS — U07.1 COVID-19 DETERMINED BY CLINICAL DIAGNOSTIC CRITERIA: Primary | ICD-10-CM

## 2021-01-28 DIAGNOSIS — U07.1 COVID-19 DETERMINED BY CLINICAL DIAGNOSTIC CRITERIA: ICD-10-CM

## 2021-01-28 PROCEDURE — U0003 INFECTIOUS AGENT DETECTION BY NUCLEIC ACID (DNA OR RNA); SEVERE ACUTE RESPIRATORY SYNDROME CORONAVIRUS 2 (SARS-COV-2) (CORONAVIRUS DISEASE [COVID-19]), AMPLIFIED PROBE TECHNIQUE, MAKING USE OF HIGH THROUGHPUT TECHNOLOGIES AS DESCRIBED BY CMS-2020-01-R: HCPCS | Performed by: PHYSICIAN ASSISTANT

## 2021-01-28 PROCEDURE — U0005 INFEC AGEN DETEC AMPLI PROBE: HCPCS | Performed by: PHYSICIAN ASSISTANT

## 2021-01-28 NOTE — TELEPHONE ENCOUNTER
Regarding: COVID-exposed  ----- Message from Dixie Gonazlez sent at 1/27/2021  7:34 PM EST -----  "covid test request, direct exposure, asymptomatic"     (radha 1 of 2)

## 2021-01-28 NOTE — TELEPHONE ENCOUNTER
Reason for Disposition   [1] COVID-19 infection suspected by caller or triager AND [2] mild symptoms (cough, fever, or others) AND [5] no complications or SOB    Additional Information   [1] Symptoms of COVID-19 (e g , cough, fever, SOB, or others) AND [2] within 14 days of EXPOSURE (close contact) with diagnosed or suspected COVID-19 patient    Answer Assessment - Initial Assessment Questions  1  COVID-19 CLOSE CONTACT: "Who is the person with the confirmed or suspected COVID-19 infection that you were exposed to?"       tested positive for covid yesterday  2  PLACE of CONTACT: "Where were you when you were exposed to COVID-19?" (e g , home, school, medical waiting room; which city?)    home  3  TYPE of CONTACT: "How much contact was there?" (e g , sitting next to, live in same house, work in same office, same building)     Live in same house  4  DURATION of CONTACT: "How long were you in contact with the COVID-19 patient?" (e g , a few seconds, passed by person, a few minutes, 15 minutes or longer, live with the patient)      Lives with patient  5  MASK: "Were you wearing a mask?" "Was the other person wearing a mask?" Note: wearing a mask reduces the risk of an otherwise close contact  unmasked  6  DATE of CONTACT: "When did you have contact with a COVID-19 patient?" (e g , how many days ago)      1/27  7  COMMUNITY SPREAD: "Are there lots of cases of COVID-19 (community spread) where you live?" (See public health department website, if unsure)        no  8  SYMPTOMS: "Do you have any symptoms?" (e g , fever, cough, breathing difficulty, loss of taste or smell)      Cold like symptoms started today  9  PREGNANCY OR POSTPARTUM: "Is there any chance you are pregnant?" "When was your last menstrual period?" "Did you deliver in the last 2 weeks?"      No - LMP - hysterectomy  10  HIGH RISK: "Do you have any heart or lung problems?  Do you have a weak immune system?" (e g , heart failure, COPD, asthma, HIV positive, chemotherapy, renal failure, diabetes mellitus, sickle cell anemia, obesity)       no  11  TRAVEL: "Have you traveled out of the country recently?" If so, "When and where?" Also ask about out-of-state travel, since the CDC has identified some high-risk cities for community spread in the  Note: Travel becomes less relevant if there is widespread community transmission where the patient lives         no    Protocols used: CORONAVIRUS (COVID-19) DIAGNOSED OR SUSPECTED-ADULT-AH, CORONAVIRUS (COVID-19) EXPOSURE-ADULT-AH

## 2021-01-29 LAB — SARS-COV-2 RNA RESP QL NAA+PROBE: POSITIVE

## 2021-02-08 ENCOUNTER — TELEPHONE (OUTPATIENT)
Dept: FAMILY MEDICINE CLINIC | Facility: CLINIC | Age: 51
End: 2021-02-08

## 2021-02-08 NOTE — TELEPHONE ENCOUNTER
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 Please advise  Thank you      Ok to place [address]

## 2021-02-17 ENCOUNTER — TELEMEDICINE (OUTPATIENT)
Dept: FAMILY MEDICINE CLINIC | Facility: CLINIC | Age: 51
End: 2021-02-17
Payer: COMMERCIAL

## 2021-02-17 ENCOUNTER — TELEPHONE (OUTPATIENT)
Dept: FAMILY MEDICINE CLINIC | Facility: CLINIC | Age: 51
End: 2021-02-17

## 2021-02-17 DIAGNOSIS — E04.2 MULTINODULAR GOITER: ICD-10-CM

## 2021-02-17 DIAGNOSIS — R53.83 FATIGUE, UNSPECIFIED TYPE: ICD-10-CM

## 2021-02-17 DIAGNOSIS — Z86.16 PERSONAL HISTORY OF COVID-19: Primary | ICD-10-CM

## 2021-02-17 DIAGNOSIS — E78.5 HYPERLIPIDEMIA, UNSPECIFIED HYPERLIPIDEMIA TYPE: ICD-10-CM

## 2021-02-17 PROCEDURE — 99213 OFFICE O/P EST LOW 20 MIN: CPT | Performed by: FAMILY MEDICINE

## 2021-02-17 NOTE — PROGRESS NOTES
COVID-19 Virtual Visit     Assessment/Plan:  Jason Salgado was seen today for covid-19  Diagnoses and all orders for this visit:    Personal history of covid-19    Fatigue, unspecified type  -     CBC and differential; Future    Multinodular goiter  Comments:  pt has had no labs for almost 2 years and no routine f/u appt here since 2018- advised obtain labs and schedule appt for f/u and to discuss results  Orders:  -     TSH, 3rd generation with Free T4 reflex; Future    Hyperlipidemia, unspecified hyperlipidemia type  -     Lipid panel; Future  -     Comprehensive metabolic panel; Future        Problem List Items Addressed This Visit        Endocrine    Multinodular goiter    Relevant Orders    TSH, 3rd generation with Free T4 reflex       Other    Personal history of covid-19 - Primary    Hyperlipidemia    Relevant Orders    Lipid panel    Comprehensive metabolic panel      Other Visit Diagnoses     Fatigue, unspecified type        Relevant Orders    CBC and differential         Disposition:     I have spent 20 minutes directly with the patient  Encounter provider Comfort Koenig DO    Provider located at [address] [place] [address] Se  [address] [facility] [id number]    Recent Visits  Date Type Provider Dept   02/17/21 Telemedicine Bakari Atkins   02/17/21 Telephone Dagmar Prader, [address] [place] [address] [facility] At [address] [facility] recent visits within past 7 days and meeting all other requirements     Future Appointments  No visits were found meeting these conditions  Showing future appointments within next 150 days and meeting all other requirements      This virtual check-in was done via [employer] and patient was informed that this is a secure, HIPAA-compliant platform  She agrees to proceed  Patient agrees to participate in a virtual check in via telephone or video visit instead of presenting to the office to address urgent/immediate medical needs  Patient is aware this is a billable service  After connecting through [facility], the patient was identified by name and date of birth  Jin Farris was informed that this was a telemedicine visit and that the exam was being conducted confidentially over secure lines  My office door was closed  No one else was in the room  Jin Farris acknowledged consent and understanding of privacy and security of the telemedicine visit  I informed the patient that I have reviewed her record in Epic and presented the opportunity for her to ask any questions regarding the visit today  The patient agreed to participate  Subjective:   Jin Farris is a 52 y o  female who has been screened for COVID-19  Patient denies fever  Date of positive COVID-19 PCR: 2021    "Cough and postnasal drip- thicker, coughing up mucous in the morning- more like white, still have the chest congestion, energy level isn't quite back either, still feeling weak and tired and going up the steps feel winded"  Tested positive  - other sx included "everything- fever, chills, achy, cough, stuffy nose, diarrhea, little nausea one day- all went away"    Lab Results   Component Value Date    SARSCOV2 Positive (A) 2021     Past Medical History:   Diagnosis Date    Nontoxic single thyroid nodule     Last assessed 2015    Tubal pregnancy      Past Surgical History:   Procedure Laterality Date     SECTION      DILATION AND CURETTAGE OF UTERUS      HYSTERECTOMY       Current Outpatient Medications   Medication Sig Dispense Refill    Ascorbic Acid (VITAMIN C PO) Take by mouth      Cholecalciferol (VITAMIN D3) 1000 units CAPS Take 5,000 Units by mouth      Multiple Vitamin (MULTI-VITAMIN DAILY PO) Take 1 tablet by mouth      Multiple Vitamins-Minerals (ZINC PO) Take by mouth      Omega-3 Fatty Acids (FISH OIL PO) Take 2 g by mouth       No current facility-administered medications for this visit        Allergies Allergen Reactions    Meperidine Hives and GI Intolerance     (morphine OK)       Review of Systems   Constitutional: Negative for fever  Objective: There were no vitals filed for this visit  Physical Exam  Constitutional:       General: She is not in acute distress  Appearance: She is not ill-appearing, toxic-appearing or diaphoretic  HENT:      Head: Normocephalic and atraumatic  Nose: Nose normal       Mouth/Throat:      Mouth: Mucous membranes are moist    Eyes:      Conjunctiva/sclera: Conjunctivae normal    Pulmonary:      Effort: Pulmonary effort is normal    Skin:     Coloration: Skin is not pale  Neurological:      Mental Status: She is alert and oriented to person, place, and time  Psychiatric:         Mood and Affect: Mood normal        VIRTUAL VISIT DISCLAIMER    Earnestine Mcgrath acknowledges that she has consented to an online visit or consultation  She understands that the online visit is based solely on information provided by her, and that, in the absence of a face-to-face physical evaluation by the physician, the diagnosis she receives is both limited and provisional in terms of accuracy and completeness  This is not intended to replace a full medical face-to-face evaluation by the physician  Earnestine Mcgrath understands and accepts these terms

## 2021-02-17 NOTE — TELEPHONE ENCOUNTER
Lexy tested positive for Covid 1/28/2021 and returned to work last week  She is still coughing up white mucous and phlegm and post nasal drip that is thick  She would like to know if this is normal or if medication is needed?   Please advise  Thank you

## 2021-03-10 DIAGNOSIS — Z23 ENCOUNTER FOR IMMUNIZATION: ICD-10-CM

## 2021-07-07 ENCOUNTER — APPOINTMENT (OUTPATIENT)
Dept: LAB | Facility: CLINIC | Age: 51
End: 2021-07-07
Payer: COMMERCIAL

## 2021-07-07 DIAGNOSIS — R53.83 FATIGUE, UNSPECIFIED TYPE: ICD-10-CM

## 2021-07-07 DIAGNOSIS — E78.5 HYPERLIPIDEMIA, UNSPECIFIED HYPERLIPIDEMIA TYPE: ICD-10-CM

## 2021-07-07 DIAGNOSIS — E04.2 MULTINODULAR GOITER: ICD-10-CM

## 2021-07-07 LAB
ALBUMIN SERPL BCP-MCNC: 3.9 G/DL (ref 3.5–5)
ALP SERPL-CCNC: 100 U/L (ref 46–116)
ALT SERPL W P-5'-P-CCNC: 55 U/L (ref 12–78)
ANION GAP SERPL CALCULATED.3IONS-SCNC: 6 MMOL/L (ref 4–13)
AST SERPL W P-5'-P-CCNC: 28 U/L (ref 5–45)
BASOPHILS # BLD AUTO: 0.02 THOUSANDS/ΜL (ref 0–0.1)
BASOPHILS NFR BLD AUTO: 0 % (ref 0–1)
BILIRUB SERPL-MCNC: 0.54 MG/DL (ref 0.2–1)
BUN SERPL-MCNC: 15 MG/DL (ref 5–25)
CALCIUM SERPL-MCNC: 9 MG/DL (ref 8.3–10.1)
CHLORIDE SERPL-SCNC: 109 MMOL/L (ref 100–108)
CHOLEST SERPL-MCNC: 170 MG/DL (ref 50–200)
CO2 SERPL-SCNC: 23 MMOL/L (ref 21–32)
CREAT SERPL-MCNC: 0.67 MG/DL (ref 0.6–1.3)
EOSINOPHIL # BLD AUTO: 0.09 THOUSAND/ΜL (ref 0–0.61)
EOSINOPHIL NFR BLD AUTO: 1 % (ref 0–6)
ERYTHROCYTE [DISTWIDTH] IN BLOOD BY AUTOMATED COUNT: 11.9 % (ref 11.6–15.1)
GFR SERPL CREATININE-BSD FRML MDRD: 104 ML/MIN/1.73SQ M
GLUCOSE P FAST SERPL-MCNC: 93 MG/DL (ref 65–99)
HCT VFR BLD AUTO: 40.7 % (ref 34.8–46.1)
HDLC SERPL-MCNC: 60 MG/DL
HGB BLD-MCNC: 13.8 G/DL (ref 11.5–15.4)
IMM GRANULOCYTES # BLD AUTO: 0.01 THOUSAND/UL (ref 0–0.2)
IMM GRANULOCYTES NFR BLD AUTO: 0 % (ref 0–2)
LDLC SERPL CALC-MCNC: 97 MG/DL (ref 0–100)
LYMPHOCYTES # BLD AUTO: 1.91 THOUSANDS/ΜL (ref 0.6–4.47)
LYMPHOCYTES NFR BLD AUTO: 26 % (ref 14–44)
MCH RBC QN AUTO: 30.9 PG (ref 26.8–34.3)
MCHC RBC AUTO-ENTMCNC: 33.9 G/DL (ref 31.4–37.4)
MCV RBC AUTO: 91 FL (ref 82–98)
MONOCYTES # BLD AUTO: 0.53 THOUSAND/ΜL (ref 0.17–1.22)
MONOCYTES NFR BLD AUTO: 7 % (ref 4–12)
NEUTROPHILS # BLD AUTO: 4.81 THOUSANDS/ΜL (ref 1.85–7.62)
NEUTS SEG NFR BLD AUTO: 66 % (ref 43–75)
NONHDLC SERPL-MCNC: 110 MG/DL
NRBC BLD AUTO-RTO: 0 /100 WBCS
PLATELET # BLD AUTO: 239 THOUSANDS/UL (ref 149–390)
PMV BLD AUTO: 11.4 FL (ref 8.9–12.7)
POTASSIUM SERPL-SCNC: 4.3 MMOL/L (ref 3.5–5.3)
PROT SERPL-MCNC: 7.7 G/DL (ref 6.4–8.2)
RBC # BLD AUTO: 4.47 MILLION/UL (ref 3.81–5.12)
SODIUM SERPL-SCNC: 138 MMOL/L (ref 136–145)
TRIGL SERPL-MCNC: 66 MG/DL
TSH SERPL DL<=0.05 MIU/L-ACNC: 0.75 UIU/ML (ref 0.36–3.74)
WBC # BLD AUTO: 7.37 THOUSAND/UL (ref 4.31–10.16)

## 2021-07-07 PROCEDURE — 80053 COMPREHEN METABOLIC PANEL: CPT

## 2021-07-07 PROCEDURE — 80061 LIPID PANEL: CPT

## 2021-07-07 PROCEDURE — 84443 ASSAY THYROID STIM HORMONE: CPT

## 2021-07-07 PROCEDURE — 85025 COMPLETE CBC W/AUTO DIFF WBC: CPT

## 2021-07-07 PROCEDURE — 36415 COLL VENOUS BLD VENIPUNCTURE: CPT

## 2021-09-08 ENCOUNTER — TELEPHONE (OUTPATIENT)
Dept: FAMILY MEDICINE CLINIC | Facility: CLINIC | Age: 51
End: 2021-09-08

## 2021-09-08 NOTE — TELEPHONE ENCOUNTER
Only appt with any provider here since 2019 was a telemed visit for Len 02/2021, and at that visit she was instructed to obtain labs and schedule appt for f/u and to discuss results

## 2021-09-29 ENCOUNTER — TELEMEDICINE (OUTPATIENT)
Dept: FAMILY MEDICINE CLINIC | Facility: CLINIC | Age: 51
End: 2021-09-29
Payer: COMMERCIAL

## 2021-09-29 DIAGNOSIS — Z13.31 DEPRESSION SCREENING NEGATIVE: ICD-10-CM

## 2021-09-29 DIAGNOSIS — E78.5 HYPERLIPIDEMIA, UNSPECIFIED HYPERLIPIDEMIA TYPE: Primary | ICD-10-CM

## 2021-09-29 PROBLEM — J10.1 INFLUENZA A: Status: RESOLVED | Noted: 2019-01-02 | Resolved: 2021-09-29

## 2021-09-29 PROCEDURE — 99213 OFFICE O/P EST LOW 20 MIN: CPT | Performed by: FAMILY MEDICINE

## 2022-04-13 ENCOUNTER — OFFICE VISIT (OUTPATIENT)
Dept: URGENT CARE | Facility: CLINIC | Age: 52
End: 2022-04-13
Payer: COMMERCIAL

## 2022-04-13 VITALS — OXYGEN SATURATION: 98 % | HEART RATE: 78 BPM | TEMPERATURE: 97.6 F | RESPIRATION RATE: 18 BRPM

## 2022-04-13 DIAGNOSIS — J06.9 VIRAL URI: Primary | ICD-10-CM

## 2022-04-13 PROCEDURE — 99213 OFFICE O/P EST LOW 20 MIN: CPT | Performed by: PHYSICIAN ASSISTANT

## 2022-04-13 NOTE — PROGRESS NOTES
[facility] Now    NAME: Becki Zarco is a 54 y o  female  : 1970    MRN: [id number]  DATE: 2022  TIME: 3:03 PM    Assessment and Plan   Viral URI [J06 9]  1  Viral URI         Patient Instructions     Patient Instructions   Infection appears viral   Recommend symptomatic treatment  Can take ibuprofen or tylenol as needed for pain or fever  Over the counter cough and cold medications to help with symptoms  Use salt water gargles for sore throat and throat lozenges  Cough drops as needed  Wash hands frequently to prevent the spread of infection  If not improving over the next 7-10 days, follow up with PCP  Symptoms may persist for 10-14 days  Chief Complaint     Chief Complaint   Patient presents with    Cough     Cough, fever and congestion since   History of Present Illness   72-year-old female here with complaint of cough, congestion and fever for the last 4 days  Fever subsided today  Just wants to make sure she does not need an antibiotic is starting to feel a bit better  Review of Systems   Review of Systems   Constitutional: Positive for chills and fever  Negative for appetite change  HENT: Positive for congestion  Negative for ear discharge, ear pain, facial swelling, postnasal drip, sinus pressure, sneezing and sore throat  Respiratory: Positive for cough  Negative for shortness of breath and wheezing  Neurological: Negative for headaches         Current Medications     Current Outpatient Medications:     Ascorbic Acid (VITAMIN C PO), Take by mouth, Disp: , Rfl:     Cholecalciferol (VITAMIN D3) 1000 units CAPS, Take 5,000 Units by mouth, Disp: , Rfl:     Multiple Vitamin (MULTI-VITAMIN DAILY PO), Take 1 tablet by mouth, Disp: , Rfl:     Multiple Vitamins-Minerals (ZINC PO), Take by mouth, Disp: , Rfl:     Omega-3 Fatty Acids (FISH OIL PO), Take 2 g by mouth, Disp: , Rfl:     Current Allergies     Allergies as of 2022 - Reviewed 2022   Allergen Reaction Noted    Meperidine Hives and GI Intolerance 2007          The following portions of the patient's history were reviewed and updated as appropriate: allergies, current medications, past family history, past medical history, past social history, past surgical history and problem list    Past Medical History:   Diagnosis Date    Influenza A 2019    Nontoxic single thyroid nodule     Last assessed 2015    Tubal pregnancy      Past Surgical History:   Procedure Laterality Date     SECTION      DILATION AND CURETTAGE OF UTERUS      HYSTERECTOMY       Family History   Problem Relation Age of Onset    Marfan syndrome Family     No Known Problems Family      Social History     Socioeconomic History    Marital status: /Civil Union     Spouse name: Not on file    Number of children: Not on file    Years of education: Not on file    Highest education level: Not on file   Occupational History    Not on file   Tobacco Use    Smoking status: Never Smoker    Smokeless tobacco: Never Used   Substance and Sexual Activity    Alcohol use: Yes     Comment: Social    Drug use: Not on file    Sexual activity: Not on file   Other Topics Concern    Not on file   Social History Narrative    Uses seatbelts     Social Determinants of Health     Financial Resource Strain: Not on file   Food Insecurity: Not on file   Transportation Needs: Not on file   Physical Activity: Not on file   Stress: Not on file   Social Connections: Not on file   Intimate Partner Violence: Not on file   Housing Stability: Not on file     Medications have been verified  Objective   Pulse 78   Temp 97 6 °F (36 4 °C)   Resp 18   SpO2 98%      Physical Exam   Physical Exam  Vitals and nursing note reviewed  Constitutional:       General: She is not in acute distress  Appearance: She is well-developed  HENT:      Head: Normocephalic and atraumatic        Right Ear: Tympanic membrane normal       Left Ear: Tympanic membrane normal       Nose: Nose normal  No mucosal edema or rhinorrhea  Right Sinus: No maxillary sinus tenderness or frontal sinus tenderness  Left Sinus: No maxillary sinus tenderness or frontal sinus tenderness  Mouth/Throat:      Pharynx: No oropharyngeal exudate or posterior oropharyngeal erythema  Eyes:      Conjunctiva/sclera: Conjunctivae normal    Cardiovascular:      Rate and Rhythm: Normal rate and regular rhythm  Heart sounds: Normal heart sounds  No murmur heard        Pulmonary:      Effort: Pulmonary effort is normal

## 2022-12-13 ENCOUNTER — TELEPHONE (OUTPATIENT)
Dept: ADMINISTRATIVE | Facility: OTHER | Age: 52
End: 2022-12-13

## 2022-12-13 NOTE — TELEPHONE ENCOUNTER
----- Message from [employer] sent at 12/13/2022  9:45 AM EST -----  Regarding: Mammogram  12/13/22 9:45 AM    Hello, our patient Marianne Montana has had Mammogram completed/performed  Please assist in updating the patient chart by pulling the Care Everywhere (CE) document  The date of service is 01/05/22       Thank you,  [employer]  [facility] AT [facility] AT [facility]

## 2022-12-13 NOTE — TELEPHONE ENCOUNTER
Upon review of the In Basket request we were able to locate, review, and update the patient chart as requested for Mammogram     Any additional questions or concerns should be emailed to the Practice Liaisons via the appropriate education email address, please do not reply via In Basket      Thank you  Ayana Li MA

## 2023-02-08 NOTE — TELEPHONE ENCOUNTER
It is hard to tell without full assessment of patient   Offer virtual Pt is scheduled for a cysto with Dr Kandy Solo on 03/10/23 but she can't make that appointment  She wants to rescheduled but the first available with Dr Kandy Solo is in April and she doesn't think she can wait that long  Should this be scheduled with a different provider sooner?      Please review and advice     Pt can be reached [phone]

## 2023-02-09 ENCOUNTER — TELEPHONE (OUTPATIENT)
Dept: FAMILY MEDICINE CLINIC | Facility: CLINIC | Age: 53
End: 2023-02-09

## 2023-07-07 ENCOUNTER — TELEPHONE (OUTPATIENT)
Dept: FAMILY MEDICINE CLINIC | Facility: CLINIC | Age: 53
End: 2023-07-07

## 2023-08-15 ENCOUNTER — TELEPHONE (OUTPATIENT)
Dept: PLASTIC SURGERY | Facility: CLINIC | Age: 53
End: 2023-08-15

## 2023-08-15 NOTE — TELEPHONE ENCOUNTER
Pt called in regards to breast reduction consultation. I let pt know either Mp Palomino or myself will reach out to her in about 1 week to go over criteria needed for a BR consultation.

## 2023-08-22 ENCOUNTER — TELEPHONE (OUTPATIENT)
Dept: PLASTIC SURGERY | Facility: HOSPITAL | Age: 53
End: 2023-08-22

## 2023-08-22 NOTE — TELEPHONE ENCOUNTER
Was given information that Juana Singh was interested in a breast reduction but upon calling patient she informed me that it is for her daughter, Justin Medina. Reviewed criteria  And emailed. Telephone encounter in correct patient's chart.

## 2024-02-21 PROBLEM — R05.8 COUGH PRODUCTIVE OF PURULENT SPUTUM: Status: RESOLVED | Noted: 2019-01-02 | Resolved: 2024-02-21

## 2024-07-15 ENCOUNTER — APPOINTMENT (OUTPATIENT)
Dept: LAB | Facility: CLINIC | Age: 54
End: 2024-07-15
Payer: COMMERCIAL

## 2024-07-15 DIAGNOSIS — Z13.6 SCREENING FOR HYPERTENSION: ICD-10-CM

## 2024-07-15 DIAGNOSIS — F41.8 DEPRESSION WITH ANXIETY: ICD-10-CM

## 2024-07-15 DIAGNOSIS — R03.0 ELEVATED BLOOD PRESSURE READING WITHOUT DIAGNOSIS OF HYPERTENSION: ICD-10-CM

## 2024-07-15 LAB
ALBUMIN SERPL BCG-MCNC: 4.5 G/DL (ref 3.5–5)
ALP SERPL-CCNC: 57 U/L (ref 34–104)
ALT SERPL W P-5'-P-CCNC: 15 U/L (ref 7–52)
ANION GAP SERPL CALCULATED.3IONS-SCNC: 11 MMOL/L (ref 4–13)
AST SERPL W P-5'-P-CCNC: 17 U/L (ref 13–39)
BACTERIA UR QL AUTO: ABNORMAL /HPF
BILIRUB SERPL-MCNC: 0.54 MG/DL (ref 0.2–1)
BILIRUB UR QL STRIP: NEGATIVE
BUN SERPL-MCNC: 12 MG/DL (ref 5–25)
CALCIUM SERPL-MCNC: 9.4 MG/DL (ref 8.4–10.2)
CHLORIDE SERPL-SCNC: 105 MMOL/L (ref 96–108)
CHOLEST SERPL-MCNC: 152 MG/DL
CLARITY UR: ABNORMAL
CO2 SERPL-SCNC: 22 MMOL/L (ref 21–32)
COLOR UR: YELLOW
CREAT SERPL-MCNC: 0.6 MG/DL (ref 0.6–1.3)
ERYTHROCYTE [DISTWIDTH] IN BLOOD BY AUTOMATED COUNT: 11.7 % (ref 11.6–15.1)
GFR SERPL CREATININE-BSD FRML MDRD: 105 ML/MIN/1.73SQ M
GLUCOSE P FAST SERPL-MCNC: 84 MG/DL (ref 65–99)
GLUCOSE UR STRIP-MCNC: NEGATIVE MG/DL
HCT VFR BLD AUTO: 41.3 % (ref 34.8–46.1)
HDLC SERPL-MCNC: 52 MG/DL
HGB BLD-MCNC: 13.7 G/DL (ref 11.5–15.4)
HGB UR QL STRIP.AUTO: NEGATIVE
KETONES UR STRIP-MCNC: NEGATIVE MG/DL
LDLC SERPL CALC-MCNC: 84 MG/DL (ref 0–100)
LEUKOCYTE ESTERASE UR QL STRIP: ABNORMAL
MCH RBC QN AUTO: 30.2 PG (ref 26.8–34.3)
MCHC RBC AUTO-ENTMCNC: 33.2 G/DL (ref 31.4–37.4)
MCV RBC AUTO: 91 FL (ref 82–98)
MUCOUS THREADS UR QL AUTO: ABNORMAL
NITRITE UR QL STRIP: POSITIVE
NON-SQ EPI CELLS URNS QL MICRO: ABNORMAL /HPF
NONHDLC SERPL-MCNC: 100 MG/DL
PH UR STRIP.AUTO: 7 [PH]
PLATELET # BLD AUTO: 259 THOUSANDS/UL (ref 149–390)
PMV BLD AUTO: 11.6 FL (ref 8.9–12.7)
POTASSIUM SERPL-SCNC: 4.2 MMOL/L (ref 3.5–5.3)
PROT SERPL-MCNC: 7.4 G/DL (ref 6.4–8.4)
PROT UR STRIP-MCNC: ABNORMAL MG/DL
RBC # BLD AUTO: 4.53 MILLION/UL (ref 3.81–5.12)
RBC #/AREA URNS AUTO: ABNORMAL /HPF
SODIUM SERPL-SCNC: 138 MMOL/L (ref 135–147)
SP GR UR STRIP.AUTO: 1.02 (ref 1–1.03)
TRIGL SERPL-MCNC: 79 MG/DL
TSH SERPL DL<=0.05 MIU/L-ACNC: 0.89 UIU/ML (ref 0.45–4.5)
UROBILINOGEN UR STRIP-ACNC: <2 MG/DL
WBC # BLD AUTO: 6.18 THOUSAND/UL (ref 4.31–10.16)
WBC #/AREA URNS AUTO: ABNORMAL /HPF

## 2024-07-15 PROCEDURE — 80053 COMPREHEN METABOLIC PANEL: CPT

## 2024-07-15 PROCEDURE — 85027 COMPLETE CBC AUTOMATED: CPT

## 2024-07-15 PROCEDURE — 81001 URINALYSIS AUTO W/SCOPE: CPT

## 2024-07-15 PROCEDURE — 84443 ASSAY THYROID STIM HORMONE: CPT

## 2024-07-15 PROCEDURE — 36415 COLL VENOUS BLD VENIPUNCTURE: CPT

## 2024-07-15 PROCEDURE — 80061 LIPID PANEL: CPT

## 2024-07-16 DIAGNOSIS — Z00.6 ENCOUNTER FOR EXAMINATION FOR NORMAL COMPARISON OR CONTROL IN CLINICAL RESEARCH PROGRAM: ICD-10-CM

## 2024-07-23 ENCOUNTER — APPOINTMENT (OUTPATIENT)
Dept: LAB | Facility: CLINIC | Age: 54
End: 2024-07-23

## 2024-07-23 DIAGNOSIS — Z00.6 ENCOUNTER FOR EXAMINATION FOR NORMAL COMPARISON OR CONTROL IN CLINICAL RESEARCH PROGRAM: ICD-10-CM

## 2024-07-23 PROCEDURE — 36415 COLL VENOUS BLD VENIPUNCTURE: CPT

## 2024-08-02 LAB
APOB+LDLR+PCSK9 GENE MUT ANL BLD/T: NOT DETECTED
BRCA1+BRCA2 DEL+DUP + FULL MUT ANL BLD/T: NOT DETECTED
MLH1+MSH2+MSH6+PMS2 GN DEL+DUP+FUL M: NOT DETECTED

## 2024-10-10 ENCOUNTER — TELEPHONE (OUTPATIENT)
Dept: FAMILY MEDICINE CLINIC | Facility: CLINIC | Age: 54
End: 2024-10-10